# Patient Record
Sex: MALE | URBAN - METROPOLITAN AREA
[De-identification: names, ages, dates, MRNs, and addresses within clinical notes are randomized per-mention and may not be internally consistent; named-entity substitution may affect disease eponyms.]

---

## 2020-11-16 ENCOUNTER — VIRTUAL PHONE E/M (OUTPATIENT)
Dept: FAMILY MEDICINE CLINIC | Facility: CLINIC | Age: 45
End: 2020-11-16
Payer: COMMERCIAL

## 2020-11-16 DIAGNOSIS — R19.7 DIARRHEA OF PRESUMED INFECTIOUS ORIGIN: Primary | ICD-10-CM

## 2020-11-16 DIAGNOSIS — I10 ESSENTIAL HYPERTENSION: ICD-10-CM

## 2020-11-16 PROCEDURE — 99203 OFFICE O/P NEW LOW 30 MIN: CPT | Performed by: FAMILY MEDICINE

## 2020-11-16 NOTE — PROGRESS NOTES
Virtual Telephone Check-In    Batsheva Duggan verbally consents to a Virtual/Telephone Check-In visit on 11/16/20. Patient has been referred to the [facility] website at [email or web address] to review the yearly Consent to Treat document.     Patient labmert

## 2020-11-19 ENCOUNTER — TELEPHONE (OUTPATIENT)
Dept: FAMILY MEDICINE CLINIC | Facility: CLINIC | Age: 45
End: 2020-11-19

## 2020-11-19 NOTE — TELEPHONE ENCOUNTER
Pt has tested positive to Covid and has also had a virtual visit with Dr John Nick on 11/16/20. Pt requesting a note for work.

## 2020-11-19 NOTE — TELEPHONE ENCOUNTER
Patient tested positive for COVID on 11/12/2020. He is feeling better and would like to return on 11/23/20. He is requesting a work note.     Routed to Dr. Marine Phillips

## (undated) NOTE — LETTER
Date: 11/19/2020    Patient Name: Estuardo Bucio          To Whom it may concern: This letter has been written at the patient's request. The above patient was evaluated at the [facility] for treatment of a medical condition.     This pa